# Patient Record
Sex: MALE | Race: WHITE | NOT HISPANIC OR LATINO | URBAN - METROPOLITAN AREA
[De-identification: names, ages, dates, MRNs, and addresses within clinical notes are randomized per-mention and may not be internally consistent; named-entity substitution may affect disease eponyms.]

---

## 2019-11-12 ENCOUNTER — OFFICE VISIT (OUTPATIENT)
Dept: FAMILY MEDICINE CLINIC | Facility: CLINIC | Age: 64
End: 2019-11-12

## 2019-11-12 VITALS
BODY MASS INDEX: 33.18 KG/M2 | HEIGHT: 72 IN | HEART RATE: 67 BPM | WEIGHT: 245 LBS | DIASTOLIC BLOOD PRESSURE: 80 MMHG | OXYGEN SATURATION: 97 % | SYSTOLIC BLOOD PRESSURE: 130 MMHG | TEMPERATURE: 97.8 F

## 2019-11-12 DIAGNOSIS — K29.00 ACUTE GASTRITIS, PRESENCE OF BLEEDING UNSPECIFIED, UNSPECIFIED GASTRITIS TYPE: Primary | ICD-10-CM

## 2019-11-12 DIAGNOSIS — Z00.00 HEALTH MAINTENANCE EXAMINATION: ICD-10-CM

## 2019-11-12 DIAGNOSIS — R10.11 RIGHT UPPER QUADRANT PAIN: ICD-10-CM

## 2019-11-12 DIAGNOSIS — Z12.5 PROSTATE CANCER SCREENING: ICD-10-CM

## 2019-11-12 DIAGNOSIS — Z12.11 SCREEN FOR COLON CANCER: ICD-10-CM

## 2019-11-12 LAB
ALBUMIN SERPL-MCNC: 4.7 G/DL (ref 3.5–5.2)
ALBUMIN/GLOB SERPL: 1.5 G/DL
ALP SERPL-CCNC: 135 U/L (ref 39–117)
ALT SERPL-CCNC: 127 U/L (ref 1–41)
AMYLASE SERPL-CCNC: 79 U/L (ref 28–100)
AST SERPL-CCNC: 162 U/L (ref 1–40)
BASOPHILS # BLD AUTO: 0.06 10*3/MM3 (ref 0–0.2)
BASOPHILS NFR BLD AUTO: 0.6 % (ref 0–1.5)
BILIRUB SERPL-MCNC: 2.3 MG/DL (ref 0.2–1.2)
BUN SERPL-MCNC: 12 MG/DL (ref 8–23)
BUN/CREAT SERPL: 14.5 (ref 7–25)
CALCIUM SERPL-MCNC: 9.9 MG/DL (ref 8.6–10.5)
CHLORIDE SERPL-SCNC: 98 MMOL/L (ref 98–107)
CHOLEST SERPL-MCNC: 198 MG/DL (ref 0–200)
CO2 SERPL-SCNC: 28.8 MMOL/L (ref 22–29)
CREAT SERPL-MCNC: 0.83 MG/DL (ref 0.76–1.27)
EOSINOPHIL # BLD AUTO: 0 10*3/MM3 (ref 0–0.4)
EOSINOPHIL NFR BLD AUTO: 0 % (ref 0.3–6.2)
ERYTHROCYTE [DISTWIDTH] IN BLOOD BY AUTOMATED COUNT: 11.8 % (ref 12.3–15.4)
GLOBULIN SER CALC-MCNC: 3.2 GM/DL
GLUCOSE SERPL-MCNC: 124 MG/DL (ref 65–99)
HCT VFR BLD AUTO: 49.6 % (ref 37.5–51)
HDLC SERPL-MCNC: 48 MG/DL (ref 40–60)
HGB BLD-MCNC: 17.4 G/DL (ref 13–17.7)
IMM GRANULOCYTES # BLD AUTO: 0.05 10*3/MM3 (ref 0–0.05)
IMM GRANULOCYTES NFR BLD AUTO: 0.5 % (ref 0–0.5)
LDLC SERPL CALC-MCNC: 139 MG/DL (ref 0–100)
LDLC/HDLC SERPL: 2.89 {RATIO}
LIPASE SERPL-CCNC: 39 U/L (ref 13–60)
LYMPHOCYTES # BLD AUTO: 0.61 10*3/MM3 (ref 0.7–3.1)
LYMPHOCYTES NFR BLD AUTO: 6.6 % (ref 19.6–45.3)
MCH RBC QN AUTO: 31.2 PG (ref 26.6–33)
MCHC RBC AUTO-ENTMCNC: 35.1 G/DL (ref 31.5–35.7)
MCV RBC AUTO: 88.9 FL (ref 79–97)
MONOCYTES # BLD AUTO: 0.42 10*3/MM3 (ref 0.1–0.9)
MONOCYTES NFR BLD AUTO: 4.5 % (ref 5–12)
NEUTROPHILS # BLD AUTO: 8.16 10*3/MM3 (ref 1.7–7)
NEUTROPHILS NFR BLD AUTO: 87.8 % (ref 42.7–76)
NRBC BLD AUTO-RTO: 0 /100 WBC (ref 0–0.2)
PLATELET # BLD AUTO: 274 10*3/MM3 (ref 140–450)
POTASSIUM SERPL-SCNC: 4.3 MMOL/L (ref 3.5–5.2)
PROT SERPL-MCNC: 7.9 G/DL (ref 6–8.5)
PSA SERPL-MCNC: 1.31 NG/ML (ref 0–4)
RBC # BLD AUTO: 5.58 10*6/MM3 (ref 4.14–5.8)
SODIUM SERPL-SCNC: 139 MMOL/L (ref 136–145)
TRIGL SERPL-MCNC: 57 MG/DL (ref 0–150)
VLDLC SERPL CALC-MCNC: 11.4 MG/DL
WBC # BLD AUTO: 9.3 10*3/MM3 (ref 3.4–10.8)

## 2019-11-12 PROCEDURE — 99214 OFFICE O/P EST MOD 30 MIN: CPT | Performed by: NURSE PRACTITIONER

## 2019-11-12 RX ORDER — SUCRALFATE 1 G/1
1 TABLET ORAL 4 TIMES DAILY
Qty: 28 TABLET | Refills: 0 | Status: SHIPPED | OUTPATIENT
Start: 2019-11-12 | End: 2019-11-19

## 2019-11-12 RX ORDER — ONDANSETRON 4 MG/1
4 TABLET, FILM COATED ORAL EVERY 6 HOURS PRN
Qty: 20 TABLET | Refills: 0 | Status: SHIPPED | OUTPATIENT
Start: 2019-11-12 | End: 2019-11-26

## 2019-11-12 RX ORDER — OMEPRAZOLE 40 MG/1
40 CAPSULE, DELAYED RELEASE ORAL DAILY
Qty: 30 CAPSULE | Refills: 0 | Status: SHIPPED | OUTPATIENT
Start: 2019-11-12 | End: 2019-11-18

## 2019-11-12 NOTE — PROGRESS NOTES
"Subjective   Yann Gregory is a 63 y.o. male.     Pleasant patient complains of epigastric and right upper quadrant pain since last night.  He ate [employer] around 5 PM then during the evening he worked on a bag of honey nights peanuts  Around midnight patient symptoms began he had nausea vomiting abdominal pain no fever no chills he vomited clear liquid approximately 6 times  Upset stomach abdominal pain and then some cramping mid abdomen  Without diarrhea unable to sleep last night quite uncomfortable  He is feeling better presently still has some mild right upper quadrant discomfort no fever  Feels tired he did not sleep well  No recurrent pain    Does not take chronic NSAIDs occasionally takes a Tylenol    He has not had a UTI in quite some time is not seeing urology in a year and a half or 2 years        Nausea   Associated symptoms include abdominal pain, nausea and vomiting. Pertinent negatives include no chest pain, chills, coughing, diaphoresis, fatigue or fever.   Vomiting    Associated symptoms include abdominal pain. Pertinent negatives include no chest pain, chills, coughing, dizziness or fever.        /80   Pulse 67   Temp 97.8 °F (36.6 °C)   Ht 182.9 cm (72\")   Wt 111 kg (245 lb)   SpO2 97%   BMI 33.23 kg/m²       The following portions of the patient's history were reviewed and updated as appropriate: allergies, current medications, past family history, past medical history, past social history, past surgical history and problem list.    Review of Systems   Constitutional: Negative for chills, diaphoresis, fatigue and fever.   HENT: Negative.  Negative for trouble swallowing.    Eyes: Negative.    Respiratory: Negative.  Negative for cough and shortness of breath.    Cardiovascular: Negative for chest pain, palpitations and leg swelling.   Gastrointestinal: Positive for abdominal pain, nausea and vomiting.   Genitourinary: Negative.    Musculoskeletal: Negative.    Skin: Negative.  "   Neurological: Negative.  Negative for dizziness and confusion.   Psychiatric/Behavioral: Negative.    All other systems reviewed and are negative.      Objective   Physical Exam   Constitutional: He is oriented to person, place, and time. He appears well-developed and well-nourished. No distress.   Pleasant appears well nontoxic no jaundice   HENT:   Head: Normocephalic and atraumatic.   Nose: Nose normal.   Mouth/Throat: Oropharynx is clear and moist.   Moist mucous membranes   Eyes: Conjunctivae are normal. Pupils are equal, round, and reactive to light. No scleral icterus.   Neck: Neck supple. No JVD present.   Cardiovascular: Normal rate, regular rhythm and normal heart sounds.   No murmur heard.  Pulmonary/Chest: Effort normal and breath sounds normal. No respiratory distress. He has no wheezes.   Abdominal: Soft. Bowel sounds are normal. He exhibits no distension and no mass. There is tenderness. There is no rebound and no guarding. No hernia.   Mild to moderate tenderness epigastric region  No significant right upper quadrant tenderness negative Mccormick's  Abdomen soft bowel sounds positive no distention  No lower abdominal tenderness  No guarding no rebound  No ascites carefully examined abdomen mild to moderate tenderness as above   Musculoskeletal: He exhibits no edema or tenderness.   Lymphadenopathy:     He has no cervical adenopathy.   Neurological: He is alert and oriented to person, place, and time.   Skin: Skin is warm and dry. He is not diaphoretic.   Normal turgor   Psychiatric: He has a normal mood and affect. His behavior is normal. Judgment and thought content normal.   Vitals reviewed.        Assessment/Plan   Yann was seen today for right side pain, nausea and vomiting.    Diagnoses and all orders for this visit:    Acute gastritis, presence of bleeding unspecified, unspecified gastritis type  -     US Gallbladder  -     CBC & Differential  -     Comprehensive Metabolic Panel  -      Amylase  -     Lipase    Right upper quadrant pain  -     US Gallbladder  -     CBC & Differential  -     Comprehensive Metabolic Panel  -     Amylase  -     Lipase    Prostate cancer screening  -     PSA Screen    Health maintenance examination  -     PSA Screen  -     Lipid Panel With LDL / HDL Ratio    Screen for colon cancer  -     Ambulatory Referral For Screening Colonoscopy    Other orders  -     omeprazole (priLOSEC) 40 MG capsule; Take 1 capsule by mouth Daily.  -     sucralfate (CARAFATE) 1 g tablet; Take 1 tablet by mouth 4 (Four) Times a Day for 7 days. BEFORE MEALS  -     ondansetron (ZOFRAN) 4 MG tablet; Take 1 tablet by mouth Every 6 (Six) Hours As Needed for Nausea or Vomiting.        Epigastric pain likely gastritis he has no right upper quadrant tenderness and without fever acute cholecystitis is less likely  He will need close follow-up in emergency room for any worsening change in condition unable keep fluids down yellowing of skin weakness tea colored urine or severe malaise          Patient Instructions   Discharge instructions  Clear liquids x1 to 2 days then gradually progressed to bland diet smaller more frequent meals    Omeprazole x2 to 4 weeks  Generic Carafate x1 week    Generic Zofran as needed for nausea vomiting    Outpatient call bladder ultrasound call for results    Push fluids frequent sips hydration important  Uncontrolled vomiting severe persistent pain increased abdominal pain weakness  Worsening symptoms high fever emergency room  Recheck in 1 week if your symptoms have not completely resolved    See gastroenterology for EGD as well as need further studies if symptoms persist or recurrent    Return to office for complete physical exam  Gastritis, Adult  Gastritis is inflammation of the stomach. There are two kinds of gastritis:  · Acute gastritis. This kind develops suddenly.  · Chronic gastritis. This kind is much more common and lasts for a long time.  Gastritis happens when  the lining of the stomach becomes weak or gets damaged. Without treatment, gastritis can lead to stomach bleeding and ulcers.  What are the causes?  This condition may be caused by:  · An infection.  · Drinking too much alcohol.  · Certain medicines. These include steroids, antibiotics, and some over-the-counter medicines, such as aspirin or ibuprofen.  · Having too much acid in the stomach.  · A disease of the intestines or stomach.  · Stress.  · An allergic reaction.  · Crohn's disease.  · Some cancer treatments (radiation).  Sometimes the cause of this condition is not known.  What are the signs or symptoms?  Symptoms of this condition include:  · Pain or a burning sensation in the upper abdomen.  · Nausea.  · Vomiting.  · An uncomfortable feeling of fullness after eating.  · Weight loss.  · Bad breath.  · Blood in your vomit or stools.  In some cases, there are no symptoms.  How is this diagnosed?  This condition may be diagnosed with:  · Your medical history and a description of your symptoms.  · A physical exam.  · Tests. These can include:  ? Blood tests.  ? Stool tests.  ? A test in which a thin, flexible instrument with a light and a camera is passed down the esophagus and into the stomach (upper endoscopy).  ? A test in which a sample of tissue is taken for testing (biopsy).  How is this treated?  This condition may be treated with medicines. The medicines that are used vary depending on the cause of the gastritis:  · If the condition is caused by a bacterial infection, you may be given antibiotic medicines.  · If the condition is caused by too much acid in the stomach, you may be given medicines called H2 blockers, proton pump inhibitors, or antacids.  Treatment may also involve stopping the use of certain medicines, such as aspirin, ibuprofen, or other NSAIDs.  Follow these instructions at home:  Medicines  · Take over-the-counter and prescription medicines only as told by your health care  provider.  · If you were prescribed an antibiotic medicine, take it as told by your health care provider. Do not stop taking the antibiotic even if you start to feel better.  Eating and drinking    · Eat small, frequent meals instead of large meals.  · Avoid foods and drinks that make your symptoms worse.  · Drink enough fluid to keep your urine pale yellow.  Alcohol use  · Do not drink alcohol if:  ? Your health care provider tells you not to drink.  ? You are pregnant, may be pregnant, or are planning to become pregnant.  · If you drink alcohol:  ? Limit your use to:  § 0-1 drink a day for women.  § 0-2 drinks a day for men.  ? Be aware of how much alcohol is in your drink. In the [place] one drink equals one 12 oz bottle of beer (355 mL), one 5 oz glass of wine (148 mL), or one 1½ oz glass of hard liquor (44 mL).  General instructions  · Talk with your health care provider about ways to manage stress, such as getting regular exercise or practicing deep breathing, meditation, or yoga.  · Do not use any products that contain nicotine or tobacco, such as cigarettes and e-cigarettes. If you need help quitting, ask your health care provider.  · Keep all follow-up visits as told by your health care provider. This is important.  Contact a health care provider if:  · Your symptoms get worse.  · Your symptoms return after treatment.  Get help right away if:  · You vomit blood or material that looks like coffee grounds.  · You have black or dark red stools.  · You are unable to keep fluids down.  · Your abdominal pain gets worse.  · You have a fever.  · You do not feel better after one week.  Summary  · Gastritis is inflammation of the lining of the stomach that can occur suddenly (acute) or develop slowly over time (chronic).  · This condition is diagnosed with a medical history, a physical exam, or tests.  · This condition may be treated with medicines to treat infection or medicines to reduce the amount of acid in your  stomach.  · Follow your health care provider's instructions about taking medicines, making changes to your diet, and knowing when to call for help.  This information is not intended to replace advice given to you by your health care provider. Make sure you discuss any questions you have with your health care provider.  Document Released: 12/12/2002 Document Revised: 05/07/2019 Document Reviewed: 05/07/2019  [employer] Interactive Patient Education © 2019 [employer]

## 2019-11-12 NOTE — PATIENT INSTRUCTIONS
Discharge instructions  Clear liquids x1 to 2 days then gradually progressed to bland diet smaller more frequent meals    Omeprazole x2 to 4 weeks  Generic Carafate x1 week    Generic Zofran as needed for nausea vomiting    Outpatient call bladder ultrasound call for results    Push fluids frequent sips hydration important  Uncontrolled vomiting severe persistent pain increased abdominal pain weakness  Worsening symptoms high fever emergency room  Recheck in 1 week if your symptoms have not completely resolved    See gastroenterology for EGD as well as need further studies if symptoms persist or recurrent    Return to office for complete physical exam  Gastritis, Adult  Gastritis is inflammation of the stomach. There are two kinds of gastritis:  · Acute gastritis. This kind develops suddenly.  · Chronic gastritis. This kind is much more common and lasts for a long time.  Gastritis happens when the lining of the stomach becomes weak or gets damaged. Without treatment, gastritis can lead to stomach bleeding and ulcers.  What are the causes?  This condition may be caused by:  · An infection.  · Drinking too much alcohol.  · Certain medicines. These include steroids, antibiotics, and some over-the-counter medicines, such as aspirin or ibuprofen.  · Having too much acid in the stomach.  · A disease of the intestines or stomach.  · Stress.  · An allergic reaction.  · Crohn's disease.  · Some cancer treatments (radiation).  Sometimes the cause of this condition is not known.  What are the signs or symptoms?  Symptoms of this condition include:  · Pain or a burning sensation in the upper abdomen.  · Nausea.  · Vomiting.  · An uncomfortable feeling of fullness after eating.  · Weight loss.  · Bad breath.  · Blood in your vomit or stools.  In some cases, there are no symptoms.  How is this diagnosed?  This condition may be diagnosed with:  · Your medical history and a description of your symptoms.  · A physical  exam.  · Tests. These can include:  ? Blood tests.  ? Stool tests.  ? A test in which a thin, flexible instrument with a light and a camera is passed down the esophagus and into the stomach (upper endoscopy).  ? A test in which a sample of tissue is taken for testing (biopsy).  How is this treated?  This condition may be treated with medicines. The medicines that are used vary depending on the cause of the gastritis:  · If the condition is caused by a bacterial infection, you may be given antibiotic medicines.  · If the condition is caused by too much acid in the stomach, you may be given medicines called H2 blockers, proton pump inhibitors, or antacids.  Treatment may also involve stopping the use of certain medicines, such as aspirin, ibuprofen, or other NSAIDs.  Follow these instructions at home:  Medicines  · Take over-the-counter and prescription medicines only as told by your health care provider.  · If you were prescribed an antibiotic medicine, take it as told by your health care provider. Do not stop taking the antibiotic even if you start to feel better.  Eating and drinking    · Eat small, frequent meals instead of large meals.  · Avoid foods and drinks that make your symptoms worse.  · Drink enough fluid to keep your urine pale yellow.  Alcohol use  · Do not drink alcohol if:  ? Your health care provider tells you not to drink.  ? You are pregnant, may be pregnant, or are planning to become pregnant.  · If you drink alcohol:  ? Limit your use to:  § 0-1 drink a day for women.  § 0-2 drinks a day for men.  ? Be aware of how much alcohol is in your drink. In the [place] one drink equals one 12 oz bottle of beer (355 mL), one 5 oz glass of wine (148 mL), or one 1½ oz glass of hard liquor (44 mL).  General instructions  · Talk with your health care provider about ways to manage stress, such as getting regular exercise or practicing deep breathing, meditation, or yoga.  · Do not use any products that contain  nicotine or tobacco, such as cigarettes and e-cigarettes. If you need help quitting, ask your health care provider.  · Keep all follow-up visits as told by your health care provider. This is important.  Contact a health care provider if:  · Your symptoms get worse.  · Your symptoms return after treatment.  Get help right away if:  · You vomit blood or material that looks like coffee grounds.  · You have black or dark red stools.  · You are unable to keep fluids down.  · Your abdominal pain gets worse.  · You have a fever.  · You do not feel better after one week.  Summary  · Gastritis is inflammation of the lining of the stomach that can occur suddenly (acute) or develop slowly over time (chronic).  · This condition is diagnosed with a medical history, a physical exam, or tests.  · This condition may be treated with medicines to treat infection or medicines to reduce the amount of acid in your stomach.  · Follow your health care provider's instructions about taking medicines, making changes to your diet, and knowing when to call for help.  This information is not intended to replace advice given to you by your health care provider. Make sure you discuss any questions you have with your health care provider.  Document Released: 12/12/2002 Document Revised: 05/07/2019 Document Reviewed: 05/07/2019  [employer] Interactive Patient Education © 2019 [employer]

## 2019-11-13 ENCOUNTER — TELEPHONE (OUTPATIENT)
Dept: FAMILY MEDICINE CLINIC | Facility: CLINIC | Age: 64
End: 2019-11-13

## 2019-11-13 NOTE — TELEPHONE ENCOUNTER
Patient advised providers/medical assistant are currently with patients and calls are returned normally at the end of the day or within 24 hours.         Pt would like a call to go over labs from 11/12/19.

## 2019-11-14 NOTE — TELEPHONE ENCOUNTER
Discussed with patient labs yesterday liver function test elevated bilirubin elevated  CBC WBC upper normal with neutrophils mildly elevated  He is presently without abdominal pain he is without fever without jaundice urine normal  He is feeling better already    Lipase amylase were negative    He should go ahead and get his gallbladder ultrasound ASAP  Focus on pushing fluids clear liquids a total of 48 hours and then advance  Certainly any increased abdominal pain fever chills immediately to the emergency room or should his skin started turning yellow coffee tea colored urine ER    We will try to add a hepatitis acute panel to existing labs thank you

## 2019-11-15 ENCOUNTER — HOSPITAL ENCOUNTER (OUTPATIENT)
Dept: ULTRASOUND IMAGING | Facility: HOSPITAL | Age: 64
Discharge: HOME | End: 2019-11-15
Admitting: NURSE PRACTITIONER

## 2019-11-15 DIAGNOSIS — K80.20 GALLSTONES: ICD-10-CM

## 2019-11-15 DIAGNOSIS — R10.11 RIGHT UPPER QUADRANT PAIN: Primary | ICD-10-CM

## 2019-11-15 LAB
HAV IGM SERPL QL IA: NEGATIVE
HBV CORE IGM SERPL QL IA: NEGATIVE
HBV SURFACE AG SERPL QL IA: NEGATIVE
HCV AB S/CO SERPL IA: 0.2 S/CO RATIO (ref 0–0.9)
Lab: NORMAL
WRITTEN AUTHORIZATION: NORMAL

## 2019-11-15 PROCEDURE — 76705 ECHO EXAM OF ABDOMEN: CPT

## 2019-11-18 ENCOUNTER — OFFICE VISIT (OUTPATIENT)
Dept: FAMILY MEDICINE CLINIC | Facility: CLINIC | Age: 64
End: 2019-11-18

## 2019-11-18 ENCOUNTER — PATIENT MESSAGE (OUTPATIENT)
Dept: FAMILY MEDICINE CLINIC | Facility: CLINIC | Age: 64
End: 2019-11-18

## 2019-11-18 VITALS
HEIGHT: 72 IN | OXYGEN SATURATION: 98 % | TEMPERATURE: 98.7 F | RESPIRATION RATE: 18 BRPM | SYSTOLIC BLOOD PRESSURE: 126 MMHG | WEIGHT: 237.8 LBS | BODY MASS INDEX: 32.21 KG/M2 | DIASTOLIC BLOOD PRESSURE: 70 MMHG | HEART RATE: 64 BPM

## 2019-11-18 DIAGNOSIS — R79.89 ELEVATED LIVER FUNCTION TESTS: ICD-10-CM

## 2019-11-18 DIAGNOSIS — K80.20 GALLSTONES: ICD-10-CM

## 2019-11-18 DIAGNOSIS — R10.10 PAIN OF UPPER ABDOMEN: Primary | ICD-10-CM

## 2019-11-18 PROCEDURE — 99213 OFFICE O/P EST LOW 20 MIN: CPT | Performed by: NURSE PRACTITIONER

## 2019-11-18 NOTE — PATIENT INSTRUCTIONS
Discharge instructions  Continue bland diet small more frequent meals  Such as 5 small meals a day  Specifically greatly decrease fatty foods  Which template the gallbladder    Keep your appointment with your surgeon next week  If increased abdominal pain yellowing scan fever vomiting weakness  Emergency room      
moderate impairment

## 2019-11-19 LAB
ALBUMIN SERPL-MCNC: 4.5 G/DL (ref 3.6–4.8)
ALBUMIN/GLOB SERPL: 1.5 {RATIO} (ref 1.2–2.2)
ALP SERPL-CCNC: 128 IU/L (ref 39–117)
ALT SERPL-CCNC: 38 IU/L (ref 0–44)
AMYLASE SERPL-CCNC: 57 U/L (ref 31–124)
AST SERPL-CCNC: 25 IU/L (ref 0–40)
BASOPHILS # BLD AUTO: 0.1 X10E3/UL (ref 0–0.2)
BASOPHILS NFR BLD AUTO: 1 %
BILIRUB SERPL-MCNC: 0.7 MG/DL (ref 0–1.2)
BUN SERPL-MCNC: 9 MG/DL (ref 8–27)
BUN/CREAT SERPL: 11 (ref 10–24)
CALCIUM SERPL-MCNC: 9.5 MG/DL (ref 8.6–10.2)
CHLORIDE SERPL-SCNC: 102 MMOL/L (ref 96–106)
CO2 SERPL-SCNC: 25 MMOL/L (ref 20–29)
CREAT SERPL-MCNC: 0.85 MG/DL (ref 0.76–1.27)
EOSINOPHIL # BLD AUTO: 0.2 X10E3/UL (ref 0–0.4)
EOSINOPHIL NFR BLD AUTO: 2 %
ERYTHROCYTE [DISTWIDTH] IN BLOOD BY AUTOMATED COUNT: 12.2 % (ref 12.3–15.4)
GLOBULIN SER CALC-MCNC: 3 G/DL (ref 1.5–4.5)
GLUCOSE SERPL-MCNC: 96 MG/DL (ref 65–99)
HCT VFR BLD AUTO: 46.1 % (ref 37.5–51)
HGB BLD-MCNC: 16.1 G/DL (ref 13–17.7)
IMM GRANULOCYTES # BLD AUTO: 0 X10E3/UL (ref 0–0.1)
IMM GRANULOCYTES NFR BLD AUTO: 0 %
LIPASE SERPL-CCNC: 34 U/L (ref 13–78)
LYMPHOCYTES # BLD AUTO: 1.7 X10E3/UL (ref 0.7–3.1)
LYMPHOCYTES NFR BLD AUTO: 20 %
MCH RBC QN AUTO: 31.2 PG (ref 26.6–33)
MCHC RBC AUTO-ENTMCNC: 34.9 G/DL (ref 31.5–35.7)
MCV RBC AUTO: 89 FL (ref 79–97)
MONOCYTES # BLD AUTO: 0.7 X10E3/UL (ref 0.1–0.9)
MONOCYTES NFR BLD AUTO: 8 %
NEUTROPHILS # BLD AUTO: 5.8 X10E3/UL (ref 1.4–7)
NEUTROPHILS NFR BLD AUTO: 69 %
PLATELET # BLD AUTO: 254 X10E3/UL (ref 150–450)
POTASSIUM SERPL-SCNC: 3.9 MMOL/L (ref 3.5–5.2)
PROT SERPL-MCNC: 7.5 G/DL (ref 6–8.5)
RBC # BLD AUTO: 5.16 X10E6/UL (ref 4.14–5.8)
SODIUM SERPL-SCNC: 142 MMOL/L (ref 134–144)
WBC # BLD AUTO: 8.3 X10E3/UL (ref 3.4–10.8)

## 2019-11-19 NOTE — PROGRESS NOTES
"Subjective   aYnn Gregory is a 63 y.o. male.     Follow-up right upper quadrant epigastric pain elevated liver function test bilirubin with an ultrasound showing gallstones and irregular gallbladder wall and mild thickening.  He is been afebrile  CBC white blood cell count high normal with mildly elevated neutrophils amylase lipase were negative as well as acute hepatitis panel negative.  Patient feels much better he is having no abdominal pain presently he is keeping all his food down without nausea he is presently taking Carafate and omeprazole without problem.  He was placed on clear liquids for couple days last week followed by bland diet small frequent meals he is been following this closely eating salads low-fat dressings and doing well  He had an isolated lower abdominal cramp earlier today resolved after BM he is without increasing malaise or weakness chills night sweats.    He is planning on leaving for his brothers tomorrow out of town, has an appointment with general surgeon Dr. Paulson next week.  I requested he follow-up today to repeat his labs to make sure he is doing well before leaving on his trip as he will likely need a cholecystectomy.  His wife would like to listen to the office visit on speaker phone which I am agreeable she is a physical therapist    Social history no alcohol abuse or tobacco abuse  He is abstaining from all alcohol at this time         /70 (BP Location: Left arm, Patient Position: Sitting, Cuff Size: Large Adult)   Pulse 64   Temp 98.7 °F (37.1 °C) (Oral)   Resp 18   Ht 182.9 cm (72.01\")   Wt 108 kg (237 lb 12.8 oz)   SpO2 98%   BMI 32.24 kg/m²       The following portions of the patient's history were reviewed and updated as appropriate: allergies, current medications, past family history, past medical history, past social history, past surgical history and problem list.    Review of Systems   Constitutional: Negative for fatigue and fever.   HENT: Negative.  " Negative for trouble swallowing.    Eyes: Negative.    Respiratory: Negative.  Negative for cough and shortness of breath.    Cardiovascular: Negative for chest pain, palpitations and leg swelling.   Gastrointestinal: Negative.  Negative for abdominal pain.   Genitourinary: Negative.    Musculoskeletal: Negative.    Skin: Negative.    Neurological: Negative.  Negative for dizziness and confusion.   Psychiatric/Behavioral: Negative.        Objective   Physical Exam   Constitutional: He is oriented to person, place, and time. He appears well-developed and well-nourished. No distress.   Pleasant appears well   HENT:   Head: Normocephalic and atraumatic.   Nose: Nose normal.   Mouth/Throat: Oropharynx is clear and moist.   Normal exam no intraoral jaundice   Eyes: Conjunctivae are normal. Pupils are equal, round, and reactive to light. No scleral icterus.   Neck: Neck supple. No JVD present.   Cardiovascular: Normal rate, regular rhythm and normal heart sounds.   No murmur heard.  Pulmonary/Chest: Effort normal and breath sounds normal. No respiratory distress. He has no wheezes.   Abdominal: Soft. Bowel sounds are normal. He exhibits no distension and no mass. There is no tenderness. There is no guarding. No hernia.   No hepatosplenomegaly   Abdomen soft bowel sounds positive no bruit or thrill  Nontender normal exam no ascites  Lower abdomen normal well without tenderness   Musculoskeletal: He exhibits no edema or tenderness.   Lymphadenopathy:     He has no cervical adenopathy.   Neurological: He is alert and oriented to person, place, and time.   Skin: Skin is warm and dry. He is not diaphoretic.   No jaundice   Psychiatric: He has a normal mood and affect. His behavior is normal. Judgment and thought content normal.   Vitals reviewed.        Assessment/Plan   Diagnoses and all orders for this visit:    Pain of upper abdomen  -     Comprehensive metabolic panel  -     CBC and Differential  -     Amylase  -      Lipase    Gallstones  -     Comprehensive metabolic panel  -     CBC and Differential  -     Amylase  -     Lipase    Elevated liver function tests  -     Comprehensive metabolic panel  -     CBC and Differential  -     Amylase  -     Lipase      Gallstones abnormal gallbladder wall and ultrasound without signs or symptoms of any acute cholecystitis presently.  His symptoms are being managed by diet presently  He will follow instructions below closely  And promises to go the emergency room should his symptoms change            Patient Instructions   Discharge instructions  Continue bland diet small more frequent meals  Such as 5 small meals a day  Specifically greatly decrease fatty foods  Which template the gallbladder    Keep your appointment with your surgeon next week  If increased abdominal pain yellowing scan fever vomiting weakness  Emergency room

## 2019-11-26 ENCOUNTER — OFFICE VISIT (OUTPATIENT)
Dept: SURGERY | Facility: CLINIC | Age: 64
End: 2019-11-26

## 2019-11-26 VITALS — WEIGHT: 236 LBS | BODY MASS INDEX: 31.97 KG/M2 | HEIGHT: 72 IN | OXYGEN SATURATION: 98 % | HEART RATE: 53 BPM

## 2019-11-26 DIAGNOSIS — K80.20 CALCULUS OF GALLBLADDER WITHOUT CHOLECYSTITIS WITHOUT OBSTRUCTION: Primary | ICD-10-CM

## 2019-11-26 PROCEDURE — 99204 OFFICE O/P NEW MOD 45 MIN: CPT | Performed by: PHYSICIAN ASSISTANT

## 2019-11-26 NOTE — PROGRESS NOTES
"CC:    Cholelithiasis    HPI:    This is a 64-year-old gentleman who presents to the office today at the request of James Epley, APRN for consultation.  He states that approximately 2 weeks ago he began to have right upper quadrant discomfort followed by indigestion and nausea and vomiting which passed after approximately 4 hours.  As a result of the symptoms he went to have an ultrasound of his right upper quadrant performed which demonstrated cholelithiasis as well as borderline gallbladder wall thickening.  He denies having any other episodes as well as no changes to his bowel habits, dark tarry stools or bright red blood with his bowel movements.    PMH:    None    PSH:     None    FMH:  Sister with gallbladder disease    ALLERGIES:   None    MEDICATIONS:  Reviewed and reconciled in Epic.    ROS:    Positive for nausea, vomiting and reflux.  All other systems reviewed and negative other than presenting complaints.    PE:  Vitals: HR 53 O2 98% weight 236 height 71.75\" BMI 32.2  Constitutional: Well-nourished, well-developed male in no acute distress  HEENT: Normocephalic, atraumatic, sclera anicteric, normal conjunctiva  Pulmonary: Clear to auscultation bilaterally, normal respiratory effort, no wheezes, rales or rhonchi noted  Cardiac: Regular rate and rhythm, no murmurs, gallops or rubs noted  Abdomen: Soft, nontender, nondistended, normal bowel sounds  Skin: Warm, dry, intact, no rashes noted  Musculoskeletal: Normal gait, no joint asymmetries noted  Psych: Alert and oriented x3, normal affect, normal judgment    CLINICAL SUMMARY (A/P):    This is a 64-year-old gentleman presenting with cholelithiasis found on ultrasound after one episode of right upper quadrant pain with nausea and vomiting.  He is very apprehensive about proceeding with surgery and would like to avoid it.  He has already changed his diet to a low-fat diet and is avoiding oils, fats and greasy foods.  I did caution him that he likely will " continue to have episodes as the wall thickening likely represents a chronic nature to this.  I did review what surgery would entail going over the risks and rationale and he understands the options.  He understands nature of the procedure and the risks including but not limited to bleeding, infection, conversion to open procedure, postoperative bile leak, and the bowel function changes which can accompany cholecystectomy.  I also reviewed the risks with not undergoing surgery with those including common bile duct obstruction and the signs and symptoms to look for with this.  I encouraged him to contact our office if he changes his mind or if he begins to have a recurrence of his symptoms.  If he has significant pain or the symptoms of a common bile duct obstruction which I reviewed with him he should go to the emergency room all questions were answered and he will contact us if and when he is ready.      Isaiah Harris PA-C

## 2021-02-04 ENCOUNTER — IMMUNIZATION (OUTPATIENT)
Dept: VACCINE CLINIC | Facility: HOSPITAL | Age: 66
End: 2021-02-04

## 2021-02-04 PROCEDURE — 0001A: CPT | Performed by: INTERNAL MEDICINE

## 2021-02-04 PROCEDURE — 91300 HC SARSCOV02 VAC 30MCG/0.3ML IM: CPT | Performed by: INTERNAL MEDICINE

## 2021-02-25 ENCOUNTER — IMMUNIZATION (OUTPATIENT)
Dept: VACCINE CLINIC | Facility: HOSPITAL | Age: 66
End: 2021-02-25

## 2021-02-25 PROCEDURE — 0002A: CPT | Performed by: INTERNAL MEDICINE

## 2021-02-25 PROCEDURE — 91300 HC SARSCOV02 VAC 30MCG/0.3ML IM: CPT | Performed by: INTERNAL MEDICINE

## 2022-03-21 ENCOUNTER — TELEPHONE (OUTPATIENT)
Dept: FAMILY MEDICINE CLINIC | Facility: CLINIC | Age: 67
End: 2022-03-21

## 2022-03-21 RX ORDER — PROMETHAZINE HYDROCHLORIDE 25 MG/1
25 SUPPOSITORY RECTAL EVERY 6 HOURS PRN
Qty: 6 EACH | Refills: 1 | Status: SHIPPED | OUTPATIENT
Start: 2022-03-21

## 2022-03-21 NOTE — TELEPHONE ENCOUNTER
He can try the suppositories hopefully insurance will cover these are generic not always covered below  High fever increased pain weakness emergency room  Recheck here if not improving a couple days make you

## 2022-03-21 NOTE — TELEPHONE ENCOUNTER
Caller: Yann Gregory    Relationship: Self    Best call back number: [phone]    What medication are you requesting: PHENEGRAN SUPPOSITORIES    What are your current symptoms: NAUSEA AND VOMITING, LOW GRAD EFEVER    How long have you been experiencing symptoms: STARTED TODAY 3/21/22    Have you had these symptoms before:    [] Yes  [x] No    Have you been treated for these symptoms before:   [] Yes  [x] No    If a prescription is needed, what is your preferred pharmacy and phone number:  [id number] - [place], [place] - [address] [place] [address] AT [facility] [address] [place] [address]  - [phone]  - [phone] FX    Additional notes: PATIENT STATES HIS GRANDCHILDREN HAD SIMILAR SYMPTOMS OVER THE WEEKEND. HAS DIFFICULTY TAKING MEDICATION BY MOUTH. PLEASE CALL AND ADVISE

## 2023-05-25 ENCOUNTER — OFFICE VISIT (OUTPATIENT)
Dept: FAMILY MEDICINE CLINIC | Facility: CLINIC | Age: 68
End: 2023-05-25
Payer: MEDICARE

## 2023-05-25 VITALS
HEART RATE: 57 BPM | DIASTOLIC BLOOD PRESSURE: 68 MMHG | WEIGHT: 237.9 LBS | BODY MASS INDEX: 32.22 KG/M2 | RESPIRATION RATE: 14 BRPM | TEMPERATURE: 97.5 F | HEIGHT: 72 IN | OXYGEN SATURATION: 98 % | SYSTOLIC BLOOD PRESSURE: 118 MMHG

## 2023-05-25 DIAGNOSIS — Z00.00 MEDICARE ANNUAL WELLNESS VISIT, SUBSEQUENT: Primary | ICD-10-CM

## 2023-05-25 DIAGNOSIS — Z00.00 HEALTH MAINTENANCE EXAMINATION: ICD-10-CM

## 2023-05-25 DIAGNOSIS — Z12.11 SCREEN FOR COLON CANCER: ICD-10-CM

## 2023-05-25 DIAGNOSIS — E78.2 MIXED HYPERLIPIDEMIA: ICD-10-CM

## 2023-05-25 DIAGNOSIS — Z12.5 PROSTATE CANCER SCREENING: ICD-10-CM

## 2023-05-25 NOTE — PROGRESS NOTES
Procedure   Procedures     Adult ECG Report     Name: Yann Gregory   Age: 67 y.o.   Gender: male       Rate: 57   Rhythm: sinus bradycardia   QRS Axis: nml   [place] Interval: 147   QRS Duration: 97   QTc: 401   Voltages: .   Conduction Disturbances: none   Other Abnormalities: none     Narrative Interpretation: Sinus bradycardia indication Medicare wellness, no prior ECG available for comparison

## 2023-05-25 NOTE — PATIENT INSTRUCTIONS
Discharge instructions continue healthy diet and exercise,    Decrease calories less than 2018 100 gave mostly vegetables chicken fish lots of water,    Cologuard,    Consider CT calcium score of the coronary arteries  At a later date possibly the vascular screenings of your carotid aorta and lower extremity this is important but probably the CT calcium score I would recommend first if willing    Otherwise lungs are doing well, I will see you back yearly for yearly Medicare wellness and follow-up labs  Outpatient labs soon    Thank you keep follow-up appointment needed with urology

## 2023-05-25 NOTE — PROGRESS NOTES
"Chief Complaint  Medicare Wellness-Initial Visit    Subjective        Yann Gregory presents to [facility]  History of Present Illness    Objective   Vital Signs:  /68   Pulse 57   Temp 97.5 °F (36.4 °C) (Temporal)   Resp 14   Ht 182.2 cm (71.75\")   Wt 108 kg (237 lb 14.4 oz)   SpO2 98%   BMI 32.49 kg/m²   Estimated body mass index is 32.49 kg/m² as calculated from the following:    Height as of this encounter: 182.2 cm (71.75\").    Weight as of this encounter: 108 kg (237 lb 14.4 oz).    BMI is >= 30 and <35. (Class 1 Obesity). The following options were offered after discussion;: exercise counseling/recommendations and nutrition counseling/recommendations      Physical Exam  Vitals reviewed.   Constitutional:       Appearance: He is well-developed.   HENT:      Head: Normocephalic.      Nose: Nose normal.   Eyes:      General: No scleral icterus.     Conjunctiva/sclera: Conjunctivae normal.      Pupils: Pupils are equal, round, and reactive to light.   Neck:      Thyroid: No thyromegaly.      Vascular: No JVD.      Comments: Carotids clear  Cardiovascular:      Rate and Rhythm: Normal rate and regular rhythm.      Heart sounds: Normal heart sounds. No murmur heard.    No friction rub. No gallop.   Pulmonary:      Effort: Pulmonary effort is normal. No respiratory distress.      Breath sounds: Normal breath sounds. No stridor. No wheezing or rales.   Abdominal:      General: Bowel sounds are normal. There is no distension.      Palpations: Abdomen is soft.      Tenderness: There is no abdominal tenderness.      Comments: No hepatosplenomegaly, no ascites,   Musculoskeletal:         General: No tenderness.      Cervical back: Neck supple.   Lymphadenopathy:      Cervical: No cervical adenopathy.   Skin:     General: Skin is warm and dry.      Findings: No erythema or rash.   Neurological:      Mental Status: He is alert and oriented to person, place, and time.      Deep " Tendon Reflexes: Reflexes are normal and symmetric.   Psychiatric:         Behavior: Behavior normal.         Thought Content: Thought content normal.         Judgment: Judgment normal.        Result Review :                Assessment and Plan   Diagnoses and all orders for this visit:    1. Medicare annual wellness visit, subsequent (Primary)  -     CBC & Differential; Future  -     Comprehensive Metabolic Panel; Future  -     TSH Rfx On Abnormal To Free T4; Future  -     Lipid Panel With LDL / HDL Ratio; Future  -     PSA Screen; Future  -     Urinalysis With Microscopic If Indicated (No Culture) - Urine, Clean Catch; Future  -     ECG 12 Lead    2. Health maintenance examination  -     CBC & Differential; Future  -     Comprehensive Metabolic Panel; Future  -     TSH Rfx On Abnormal To Free T4; Future  -     Lipid Panel With LDL / HDL Ratio; Future  -     PSA Screen; Future  -     Urinalysis With Microscopic If Indicated (No Culture) - Urine, Clean Catch; Future    3. Prostate cancer screening  -     CBC & Differential; Future  -     Comprehensive Metabolic Panel; Future  -     TSH Rfx On Abnormal To Free T4; Future  -     Lipid Panel With LDL / HDL Ratio; Future  -     PSA Screen; Future  -     Urinalysis With Microscopic If Indicated (No Culture) - Urine, Clean Catch; Future    4. Mixed hyperlipidemia  -     CBC & Differential; Future  -     Comprehensive Metabolic Panel; Future  -     TSH Rfx On Abnormal To Free T4; Future  -     Lipid Panel With LDL / HDL Ratio; Future  -     PSA Screen; Future  -     Urinalysis With Microscopic If Indicated (No Culture) - Urine, Clean Catch; Future  -     ECG 12 Lead    5. Screen for colon cancer  -     Cologuard - Stool, Per Rectum; Future             Follow Up   Return in about 1 year (around 5/25/2024) for Labs before next visit, Medicare Wellness.  Patient was given instructions and counseling regarding his condition or for health maintenance advice. Please see specific  information pulled into the AVS if appropriate.     Patient Instructions   Discharge instructions continue healthy diet and exercise,    Decrease calories less than 2018 100 gave mostly vegetables chicken fish lots of water,    Cologuard,    Consider CT calcium score of the coronary arteries  At a later date possibly the vascular screenings of your carotid aorta and lower extremity this is important but probably the CT calcium score I would recommend first if willing    Otherwise lungs are doing well, I will see you back yearly for yearly Medicare wellness and follow-up labs  Outpatient labs soon    Thank you keep follow-up appointment needed with urology

## 2024-03-22 ENCOUNTER — TELEPHONE (OUTPATIENT)
Dept: FAMILY MEDICINE CLINIC | Facility: CLINIC | Age: 69
End: 2024-03-22
Payer: MEDICARE

## 2025-06-03 ENCOUNTER — TELEPHONE (OUTPATIENT)
Dept: FAMILY MEDICINE CLINIC | Facility: CLINIC | Age: 70
End: 2025-06-03
Payer: MEDICARE